# Patient Record
Sex: MALE | Race: WHITE | NOT HISPANIC OR LATINO | Employment: FULL TIME | URBAN - NONMETROPOLITAN AREA
[De-identification: names, ages, dates, MRNs, and addresses within clinical notes are randomized per-mention and may not be internally consistent; named-entity substitution may affect disease eponyms.]

---

## 2017-09-11 ENCOUNTER — OFFICE VISIT (OUTPATIENT)
Dept: CARDIOLOGY | Facility: CLINIC | Age: 47
End: 2017-09-11

## 2017-09-11 VITALS
DIASTOLIC BLOOD PRESSURE: 94 MMHG | OXYGEN SATURATION: 97 % | HEART RATE: 92 BPM | WEIGHT: 305.8 LBS | HEIGHT: 70 IN | SYSTOLIC BLOOD PRESSURE: 147 MMHG | BODY MASS INDEX: 43.78 KG/M2

## 2017-09-11 DIAGNOSIS — I10 ESSENTIAL HYPERTENSION: ICD-10-CM

## 2017-09-11 DIAGNOSIS — R06.02 SHORTNESS OF BREATH: Primary | ICD-10-CM

## 2017-09-11 DIAGNOSIS — R07.9 CHEST PAIN, UNSPECIFIED TYPE: ICD-10-CM

## 2017-09-11 PROCEDURE — 99204 OFFICE O/P NEW MOD 45 MIN: CPT | Performed by: PHYSICIAN ASSISTANT

## 2017-09-11 RX ORDER — AMLODIPINE BESYLATE 5 MG/1
TABLET ORAL DAILY
COMMUNITY
Start: 2017-09-01

## 2017-09-11 RX ORDER — NITROGLYCERIN 0.4 MG/1
TABLET SUBLINGUAL
Qty: 100 TABLET | Refills: 11 | Status: SHIPPED | OUTPATIENT
Start: 2017-09-11

## 2017-09-11 RX ORDER — LEVOTHYROXINE SODIUM 112 UG/1
TABLET ORAL DAILY
COMMUNITY
Start: 2017-08-02

## 2017-09-11 RX ORDER — OMEPRAZOLE 20 MG/1
20 CAPSULE, DELAYED RELEASE ORAL DAILY PRN
COMMUNITY

## 2017-09-11 RX ORDER — LOSARTAN POTASSIUM 100 MG/1
TABLET ORAL DAILY
COMMUNITY
Start: 2017-06-24

## 2017-09-11 NOTE — PROGRESS NOTES
Subjective   Mayito Parnell is a 47 y.o. male     Chief Complaint   Patient presents with   • Establish Care   • Chest Pain   • Hypertension       HPI    Patient is a 47-year-old male that presents for an initial evaluation.  He has no history of coronary artery disease or structural heart disease.    He has been experiencing intermittent episodes of chest discomfort.  He describes the feeling of a sharp and stabbing pain in the left anterior chest that will occur and resolve spontaneously.  He was concerned about the discomfort because occasionally he will feel nausea and diaphoresis.  He has mild dyspnea when trying to exert but nothing that seems disproportionate to his activity level.  He denies PND orthopnea.  He doesn't palpitate or have dysrhythmic symptoms and otherwise feels well       Current Outpatient Prescriptions   Medication Sig Dispense Refill   • amLODIPine (NORVASC) 5 MG tablet Daily.     • levothyroxine (SYNTHROID, LEVOTHROID) 112 MCG tablet Daily.     • losartan (COZAAR) 100 MG tablet Daily.     • omeprazole (priLOSEC) 20 MG capsule Take 20 mg by mouth Daily As Needed.     • aspirin 81 MG tablet Take 1 tablet by mouth Daily. 30 tablet 11   • nitroglycerin (NITROSTAT) 0.4 MG SL tablet 1 under the tongue as needed for angina, may repeat q5mins for up three doses 100 tablet 11     No current facility-administered medications for this visit.        Review of patient's allergies indicates no known allergies.    Past Medical History:   Diagnosis Date   • Chest pain    • GERD (gastroesophageal reflux disease)    • Hypertension    • Hypothyroidism        Social History     Social History   • Marital status:      Spouse name: N/A   • Number of children: N/A   • Years of education: N/A     Occupational History   • Not on file.     Social History Main Topics   • Smoking status: Never Smoker   • Smokeless tobacco: Not on file   • Alcohol use Yes      Comment: occas. beer   • Drug use: No   • Sexual  "activity: Not on file     Other Topics Concern   • Not on file     Social History Narrative   • No narrative on file           Family History   Problem Relation Age of Onset   • Hypertension Mother    • No Known Problems Father    • Hypertension Sister    • Hypothyroidism Sister    • Heart attack Maternal Aunt    • Heart attack Maternal Uncle    • Coronary artery disease Maternal Uncle    • Aneurysm Maternal Uncle        Review of Systems   Constitutional: Positive for fatigue.   HENT: Positive for congestion (nasal), postnasal drip and sinus pressure.    Eyes: Positive for visual disturbance (wears glasses).   Respiratory: Positive for shortness of breath.    Cardiovascular: Positive for chest pain. Negative for palpitations and leg swelling.   Gastrointestinal: Negative.    Endocrine: Negative.    Genitourinary: Negative.    Musculoskeletal: Positive for myalgias.        HX carpal tunnel   Skin: Negative.    Allergic/Immunologic: Positive for environmental allergies.   Neurological: Positive for dizziness, light-headedness and numbness (hands).   Hematological: Bruises/bleeds easily (bleed).   Psychiatric/Behavioral: Negative.        Objective     /94 (BP Location: Left arm, Patient Position: Sitting)  Pulse 92  Ht 70\" (177.8 cm)  Wt (!) 305 lb 12.8 oz (139 kg)  SpO2 97%  BMI 43.88 kg/m2    Lab Results (most recent)     None          Physical Exam   Constitutional: He is oriented to person, place, and time. He appears well-developed and well-nourished. No distress.   HENT:   Head: Normocephalic and atraumatic.   Eyes: EOM are normal. Pupils are equal, round, and reactive to light.   Neck: No JVD present.   Cardiovascular: Normal rate, regular rhythm and normal heart sounds.  Exam reveals no gallop and no friction rub.    No murmur heard.  Pulmonary/Chest: Effort normal and breath sounds normal. No respiratory distress. He has no wheezes. He has no rales. He exhibits no tenderness.   Abdominal: Soft. "   Musculoskeletal: Normal range of motion. He exhibits no edema.   Neurological: He is alert and oriented to person, place, and time. No cranial nerve deficit.   Skin: Skin is warm and dry. No rash noted. No erythema. No pallor.   Psychiatric: He has a normal mood and affect. His behavior is normal.   Nursing note and vitals reviewed.      Procedure   Procedures         Assessment/Plan     Problems Addressed this Visit        Cardiovascular and Mediastinum    Essential hypertension    Relevant Medications    amLODIPine (NORVASC) 5 MG tablet    losartan (COZAAR) 100 MG tablet    Other Relevant Orders    Adult Transthoracic Echo Complete    Stress Test With Myocardial Perfusion One Day       Respiratory    Shortness of breath - Primary    Relevant Orders    Adult Transthoracic Echo Complete    Stress Test With Myocardial Perfusion One Day       Nervous and Auditory    Chest pain    Relevant Orders    Adult Transthoracic Echo Complete    Stress Test With Myocardial Perfusion One Day              Recommendation  1.  Because the patient's chest pain and risk factors, we'll schedule for an ischemia assessment.  We will order stress test.  Echocardiogram to evaluate LV function, sure normal cardiac structure, and rule out effusion.  2.  We'll see patient back for follow-up of above testing.  We'll start aspirin 81 mg daily.  Nitroglycerin as consented for chest pain and we have canceled patient had a use this medication.  We'll see back for follow-up after above testing.  Follow-up primary as scheduled

## 2017-10-10 ENCOUNTER — HOSPITAL ENCOUNTER (OUTPATIENT)
Dept: CARDIOLOGY | Facility: HOSPITAL | Age: 47
Discharge: HOME | End: 2017-10-10

## 2017-10-10 ENCOUNTER — OUTSIDE FACILITY SERVICE (OUTPATIENT)
Dept: CARDIOLOGY | Facility: CLINIC | Age: 47
End: 2017-10-10

## 2017-10-10 LAB
MAXIMAL PREDICTED HEART RATE: 173 BPM
STRESS TARGET HR: 147 BPM

## 2017-10-10 PROCEDURE — 93018 CV STRESS TEST I&R ONLY: CPT | Performed by: INTERNAL MEDICINE

## 2017-10-10 PROCEDURE — 78452 HT MUSCLE IMAGE SPECT MULT: CPT

## 2017-10-10 PROCEDURE — A9500 TC99M SESTAMIBI: HCPCS | Performed by: INTERNAL MEDICINE

## 2017-10-10 PROCEDURE — 78452 HT MUSCLE IMAGE SPECT MULT: CPT | Performed by: INTERNAL MEDICINE

## 2017-10-10 PROCEDURE — 93306 TTE W/DOPPLER COMPLETE: CPT

## 2017-10-10 PROCEDURE — 93306 TTE W/DOPPLER COMPLETE: CPT | Performed by: INTERNAL MEDICINE

## 2017-10-10 PROCEDURE — 93017 CV STRESS TEST TRACING ONLY: CPT

## 2017-10-10 PROCEDURE — 0 TECHNETIUM SESTAMIBI: Performed by: INTERNAL MEDICINE

## 2017-10-10 RX ADMIN — TECHNETIUM TC-99M SESTAMIBI 1 DOSE: 1 INJECTION INTRAVENOUS at 13:30

## 2017-10-16 ENCOUNTER — DOCUMENTATION (OUTPATIENT)
Dept: CARDIOLOGY | Facility: CLINIC | Age: 47
End: 2017-10-16